# Patient Record
Sex: FEMALE
[De-identification: names, ages, dates, MRNs, and addresses within clinical notes are randomized per-mention and may not be internally consistent; named-entity substitution may affect disease eponyms.]

---

## 2019-01-22 ENCOUNTER — HOSPITAL ENCOUNTER (EMERGENCY)
Dept: HOSPITAL 97 - ER | Age: 29
Discharge: HOME | End: 2019-01-22
Payer: SELF-PAY

## 2019-01-22 VITALS — DIASTOLIC BLOOD PRESSURE: 86 MMHG | TEMPERATURE: 98.7 F | OXYGEN SATURATION: 98 % | SYSTOLIC BLOOD PRESSURE: 120 MMHG
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
## 2019-01-22 DIAGNOSIS — S93.401A: Primary | ICD-10-CM

## 2019-01-22 PROCEDURE — 99283 EMERGENCY DEPT VISIT LOW MDM: CPT

## 2019-01-22 NOTE — RAD REPORT
EXAM DESCRIPTION:  RAD - Ankle Right 3 View - 1/22/2019 7:58 pm

 

CLINICAL HISTORY:  Right ankle pain status post fall

 

FINDINGS:  No fracture or dislocation is seen.

 

Soft tissue swelling is present laterally

## 2019-01-22 NOTE — EDPHYS
Physician Documentation                                                                           

 [facility]                                                                

Name: Adrienne Hernandez                                                                               

Age: 28 yrs                                                                                       

Sex: Female                                                                                       

: 1990                                                                                   

MRN: [id number]                                                                                   

Arrival Date: 2019                                                                          

Time: 18:59                                                                                       

Account#: [id number]                                                                            

Bed 13                                                                                            

Private MD: None, None                                                                            

ED Physician Kennedy Klein                                                                       

HPI:                                                                                              

                                                                                             

20:00 This 28 yrs old  Female presents to ER via Ambulatory with complaints of Ankle  cp  

      Injury, Fall Injury.                                                                        

20:00 The patient presents with an injury, pain, that is acute. The complaints affect the     cp  

      right ankle. Onset: The symptoms/episode began/occurred yesterday. Context: The             

      mechanism of injury involved inversion of the affected ankle. The patient can partially     

      bear weight on the affected extremity. the patient is able to ambulate, with moderate       

      difficulty. Associated signs and symptoms: Pertinent negatives: calf tenderness,            

      numbness, tingling. Severity of symptoms: in the emergency department the symptoms are      

      unchanged, despite home interventions.                                                      

                                                                                                  

OB/GYN:                                                                                           

19:30 LMP 2019                                                                           lp1 

                                                                                                  

Historical:                                                                                       

- Allergies:                                                                                      

19:31 No Known Allergies;                                                                     lp1 

- Home Meds:                                                                                      

19:31 None [Active];                                                                          lp1 

- PMHx:                                                                                           

19:31 GALLSTONES;                                                                             lp1 

- PSHx:                                                                                           

19:31 Cholecystectomy; ;                                                             lp1 

                                                                                                  

- Immunization history:: Adult Immunizations up to date.                                          

- Social history:: Smoking status: Patient/guardian denies using tobacco.                         

- Ebola Screening: : No symptoms or risks identified at this time.                                

                                                                                                  

                                                                                                  

ROS:                                                                                              

20:05 Constitutional: Negative for body aches, chills, fever, poor PO intake.                 cp  

20:05 Eyes: Negative for injury, pain, redness, and discharge.                                cp  

20:05 Cardiovascular: Negative for chest pain.                                                    

20:05 Respiratory: Negative for cough, shortness of breath, wheezing.                             

20:05 Abdomen/GI: Negative for abdominal pain, nausea, vomiting, and diarrhea.                    

20:05 MS/extremity: Positive for pain, swelling, tenderness, of the right ankle, Negative for     

      decreased range of motion, paresthesias.                                                    

20:05 Skin: Negative for cellulitis, rash.                                                        

20:05 Neuro: Negative for numbness, weakness.                                                     

20:05 All other systems are negative.                                                             

                                                                                                  

Exam:                                                                                             

20:10 Constitutional: The patient appears in no acute distress, alert, awake, non-toxic, well cp  

      developed, well nourished.                                                                  

20:10 Head/Face:  Normocephalic, atraumatic.                                                  cp  

20:10 Eyes: Periorbital structures: appear normal, Conjunctiva: normal, no exudate, no            

      injection, Lids and lashes: appear normal, bilaterally.                                     

20:10 ENT: External ear(s): are unremarkable, Nose: is normal, Mouth: is normal.                  

20:10 Chest/axilla: Inspection: normal.                                                           

20:10 Cardiovascular: Rate: normal.                                                               

20:10 Respiratory: the patient does not display signs of respiratory distress,  Respirations:     

      normal, no use of accessory muscles, no retractions, no splinting, no tachypnea.            

20:10 Abdomen/GI: Exam negative for discomfort, distension, guarding, Inspection: abdomen         

      appears normal.                                                                             

20:10 Musculoskeletal/extremity: Extremities: grossly normal except: noted in the right           

      ankle: swelling and tenderness lateral malleolus, There is no evidence of  decreased        

      ROM, deformity, Perfusion: the extremity is normally perfused throughout, Sensation         

      intact.                                                                                     

                                                                                                  

Vital Signs:                                                                                      

19:30  / 86; Pulse 73; Resp 16; Temp 98.7(O); Pulse Ox 98% on R/A; Weight 81.65 kg;     lp1 

      Height 4 ft. 10 in. (147.32 cm); Pain 10/10;                                                

19:30 Body Mass Index 37.62 (81.65 kg, 147.32 cm)                                             lp1 

                                                                                                  

MDM:                                                                                              

19:32 Patient medically screened.                                                             cp  

20:19 Differential diagnosis: fracture, sprain, dislocation. Data reviewed: vital signs,      cp  

      nurses notes, radiologic studies, plain films. Test interpretation: by ED physician or      

      midlevel provider: plain radiologic studies.                                                

                                                                                                  

                                                                                             

20:13 Order name: Crutches; Complete Time: 20:43                                              cp  

                                                                                             

20:13 Order name: Ankle Splint: Aircast; Complete Time: 20:43                                 cp  

                                                                                                  

Administered Medications:                                                                         

No medications were administered                                                                  

                                                                                                  

                                                                                                  

Disposition:                                                                                      

19 20:21 Discharged to Home. Impression: Sprain of ankle - Right.                           

- Condition is Stable.                                                                            

- Discharge Instructions: Ankle Sprain, RICE for Routine Care of Injuries.                        

- Prescriptions for Naprosyn 500 mg Oral Tablet - take 1 tablet by ORAL route 2 times             

  per day take with food; 20 tablet.                                                              

- Medication Reconciliation Form, Thank You Letter, Antibiotic Education, Prescription            

  Opioid Use form.                                                                                

- Follow up: Private Physician; When: 1 week; Reason: pain and swelling continues.                

- Problem is new.                                                                                 

- Symptoms have improved.                                                                         

                                                                                                  

                                                                                                  

                                                                                                  

Addendum:                                                                                         

2019                                                                                        

     09:25 Co-signature as Attending Physician, Kenneyd Klein MD.                                  g
s

                                                                                                  

Signatures:                                                                                       

Salud Vaughn RN                         RN   lp1                                                  

Marvel Madsen PA PA   cp                                                   

Kennedy Klein MD MD   gs                                                   

Yifan Ordoñez RN                    RN   jd3                                                  

                                                                                                  

Corrections: (The following items were deleted from the chart)                                    

                                                                                             

20:46 20:21 2019 20:21 Discharged to Home. Impression: Sprain of ankle - Right.         jd3 

      Condition is Stable. Forms are Medication Reconciliation Form, Thank You Letter,            

      Antibiotic Education, Prescription Opioid Use. Follow up: Private Physician; When: 1        

      week; Reason: pain and swelling continues. Problem is new. Symptoms have improved. cp       

                                                                                                  

**************************************************************************************************

## 2019-01-22 NOTE — ER
Nurse's Notes                                                                                     

 [facility]                                                                

Name: Adrienne Hernandez                                                                               

Age: 28 yrs                                                                                       

Sex: Female                                                                                       

: 1990                                                                                   

MRN: [id number]                                                                                   

Arrival Date: 2019                                                                          

Time: 18:59                                                                                       

Account#: [id number]                                                                            

Bed 13                                                                                            

Private MD: None, None                                                                            

Diagnosis: Sprain of ankle-Right                                                                  

                                                                                                  

Presentation:                                                                                     

                                                                                             

19:29 Presenting complaint: Patient states: Pain to R ankle worsening today after fall        lp1 

      yesterday, missed step while walking and right ankle twisted inward; Able to bear           

      weight but uncomfortable. Transition of care: patient was not received from another         

      setting of care. Onset of symptoms was 2019. Risk Assessment: Do you want       

      to hurt yourself or someone else? Patient reports no desire to harm self or others.         

      Initial Sepsis Screen: Does the patient meet any 2 criteria? No. Patient's initial          

      sepsis screen is negative. Does the patient have a suspected source of infection? No.       

      Patient's initial sepsis screen is negative. Care prior to arrival: None.                   

19:29 Method Of Arrival: Ambulatory                                                           lp1 

19:29 Acuity: LILLIE 4                                                                           lp1 

                                                                                                  

OB/GYN:                                                                                           

19:30 LMP 2019                                                                           lp1 

                                                                                                  

Historical:                                                                                       

- Allergies:                                                                                      

19:31 No Known Allergies;                                                                     lp1 

- Home Meds:                                                                                      

19:31 None [Active];                                                                          lp1 

- PMHx:                                                                                           

19:31 GALLSTONES;                                                                             lp1 

- PSHx:                                                                                           

19:31 Cholecystectomy; ;                                                             lp1 

                                                                                                  

- Immunization history:: Adult Immunizations up to date.                                          

- Social history:: Smoking status: Patient/guardian denies using tobacco.                         

- Ebola Screening: : No symptoms or risks identified at this time.                                

                                                                                                  

                                                                                                  

Screenin:31 Abuse screen: Denies threats or abuse. Denies injuries from another. Nutritional        lp1 

      screening: No deficits noted. Tuberculosis screening: No symptoms or risk factors           

      identified. Fall Risk None identified.                                                      

                                                                                                  

Assessment:                                                                                       

19:43 General: Appears in no apparent distress. uncomfortable, Behavior is calm, cooperative, jd3 

      appropriate for age. Pain: Complains of pain in right ankle Quality of pain is              

      described as sharp, tender. Neuro: Level of Consciousness is awake, alert, obeys            

      commands, Oriented to person, place, time, situation. Cardiovascular: Capillary refill      

      < 3 seconds Patient's skin is warm and dry. Respiratory: Airway is patent Respiratory       

      effort is even, unlabored, Respiratory pattern is regular, symmetrical. GI: No signs        

      and/or symptoms were reported involving the gastrointestinal system. : No signs           

      and/or symptoms were reported regarding the genitourinary system. EENT: No signs and/or     

      symptoms were reported regarding the EENT system. Derm: Skin is intact, Skin is dry,        

      Skin is normal, Skin temperature is warm. Musculoskeletal: Circulation, motion, and         

      sensation intact. Range of motion: limited in right ankle.                                  

20:43 Reassessment: Patient appears in no apparent distress at this time. Patient and/or      jd3 

      family updated on plan of care and expected duration. Pain level reassessed. Patient is     

      alert, oriented x 3, equal unlabored respirations, skin warm/dry/pink.                      

                                                                                                  

Vital Signs:                                                                                      

19:30  / 86; Pulse 73; Resp 16; Temp 98.7(O); Pulse Ox 98% on R/A; Weight 81.65 kg;     lp1 

      Height 4 ft. 10 in. (147.32 cm); Pain 10/10;                                                

19:30 Body Mass Index 37.62 (81.65 kg, 147.32 cm)                                             lp1 

                                                                                                  

ED Course:                                                                                        

18:59 Patient arrived in ED.                                                                  mr  

18:59 None, None is Private Physician.                                                        mr  

19:30 Triage completed.                                                                       lp1 

19:30 Arm band placed on right wrist.                                                         lp1 

19:32 [facility]                                                                cp  

19:32 Kennedy Klein MD is Attending Physician.                                              cp  

19:38 Yifan Ordoñez RN is Primary Nurse.                                                  jd3 

19:44 Patient has correct armband on for positive identification. Bed in low position. Call   jd3 

      light in reach. Side rails up X 1.                                                          

20:43 No provider procedures requiring assistance completed. Patient did not have IV access   jd3 

      during this emergency room visit. Crutch training done. Air stirrup applied to right        

      ankle.                                                                                      

                                                                                                  

Administered Medications:                                                                         

No medications were administered                                                                  

                                                                                                  

                                                                                                  

Outcome:                                                                                          

20:21 Discharge ordered by MD.                                                                cp  

20:44 Discharged to home ambulatory.                                                          jd3 

20:44 Condition: stable                                                                           

20:44 Discharge instructions given to patient, Instructed on discharge instructions, follow       

      up and referral plans. medication usage, Demonstrated understanding of instructions,        

      follow-up care, medications, Prescriptions given X 1.                                       

20:46 Patient left the ED.                                                                    jd3 

                                                                                                  

Signatures:                                                                                       

Cookie Braden Laura, RN                         RN   lp1                                                  

Marvel Madsen PA PA cp Davies, Jonathon, LEVI                    RN   jd3                                                  

                                                                                                  

**************************************************************************************************

## 2020-06-05 ENCOUNTER — HOSPITAL ENCOUNTER (EMERGENCY)
Dept: HOSPITAL 97 - ER | Age: 30
Discharge: HOME | End: 2020-06-05
Payer: SELF-PAY

## 2020-06-05 VITALS — OXYGEN SATURATION: 100 % | TEMPERATURE: 98.3 F | DIASTOLIC BLOOD PRESSURE: 85 MMHG | SYSTOLIC BLOOD PRESSURE: 112 MMHG

## 2020-06-05 DIAGNOSIS — N93.9: ICD-10-CM

## 2020-06-05 DIAGNOSIS — N39.0: Primary | ICD-10-CM

## 2020-06-05 LAB
BUN BLD-MCNC: 11 MG/DL (ref 7–18)
GLUCOSE SERPLBLD-MCNC: 86 MG/DL (ref 74–106)
HCT VFR BLD CALC: 34.7 % (ref 36–45)
LYMPHOCYTES # SPEC AUTO: 2 K/UL (ref 0.7–4.9)
PMV BLD: 9.4 FL (ref 7.6–11.3)
POTASSIUM SERPL-SCNC: 3.6 MMOL/L (ref 3.5–5.1)
RBC # BLD: 3.93 M/UL (ref 3.86–4.86)
UA COMPLETE W REFLEX CULTURE PNL UR: (no result)

## 2020-06-05 PROCEDURE — 87086 URINE CULTURE/COLONY COUNT: CPT

## 2020-06-05 PROCEDURE — 81003 URINALYSIS AUTO W/O SCOPE: CPT

## 2020-06-05 PROCEDURE — 87088 URINE BACTERIA CULTURE: CPT

## 2020-06-05 PROCEDURE — 87186 SC STD MICRODIL/AGAR DIL: CPT

## 2020-06-05 PROCEDURE — 96374 THER/PROPH/DIAG INJ IV PUSH: CPT

## 2020-06-05 PROCEDURE — 80048 BASIC METABOLIC PNL TOTAL CA: CPT

## 2020-06-05 PROCEDURE — 76830 TRANSVAGINAL US NON-OB: CPT

## 2020-06-05 PROCEDURE — 81025 URINE PREGNANCY TEST: CPT

## 2020-06-05 PROCEDURE — 81015 MICROSCOPIC EXAM OF URINE: CPT

## 2020-06-05 PROCEDURE — 99284 EMERGENCY DEPT VISIT MOD MDM: CPT

## 2020-06-05 PROCEDURE — 36415 COLL VENOUS BLD VENIPUNCTURE: CPT

## 2020-06-05 PROCEDURE — 87077 CULTURE AEROBIC IDENTIFY: CPT

## 2020-06-05 PROCEDURE — 85025 COMPLETE CBC W/AUTO DIFF WBC: CPT

## 2020-06-05 NOTE — ER
Nurse's Notes                                                                                     

 [facility]                                                                 

Name: Adrienne Hernandez                                                                               

Age: 29 yrs                                                                                       

Sex: Female                                                                                       

: 1990                                                                                   

MRN: [id number]                                                                                   

Arrival Date: 2020                                                                          

Time: 14:49                                                                                       

Account#: [id number]                                                                            

Bed 19                                                                                            

Private MD:                                                                                       

Diagnosis: Urinary tract infection, site not specified;Abnormal uterine and vaginal bleeding,     

  unspecified                                                                                     

                                                                                                  

Presentation:                                                                                     

                                                                                             

15:03 Chief complaint: Patient states: Vaginal bleeding with lower abd pain for 3 days.       ll1 

      Coronavirus screen: Proceed with normal triage. Patient denies a cough. Patient denies      

      shortness of breath or difficulty breathing. Patient denies measured and/or subjective      

      temperature greater than 100.4F prior to today's visit. Patient denies travel on a          

      cruise ship or to a country the [facility] currently lists as an affected area. Patient denies     

      contact with known and/or suspected case of COVID-19. Ebola Screen: Patient denies          

      travel to an Ebola-affected area in the 21 days before illness onset. Initial Sepsis        

      Screen: Does the patient meet any 2 criteria? No. Patient's initial sepsis screen is        

      negative. Risk Assessment: Do you want to hurt yourself or someone else? Patient            

      reports no desire to harm self or others. Onset of symptoms was Ellen 3, 2020.               

15:03 Method Of Arrival: Ambulatory                                                           1 

15:03 Acuity: LILLIE 3                                                                           ll1 

                                                                                                  

Historical:                                                                                       

- Allergies:                                                                                      

15:05 No Known Allergies;                                                                     ll1 

- PMHx:                                                                                           

15:05 GALLSTONES;                                                                             ll1 

- PSHx:                                                                                           

15:05 Cholecystectomy; ; Tubal ligation;                                             ll1 

                                                                                                  

- Social history:: Smoking status: Patient denies any tobacco usage or history of.                

  Patient/guardian denies using alcohol, street drugs, tobacco products.                          

                                                                                                  

                                                                                                  

Screening:                                                                                        

15:09 Abuse screen: Denies threats or abuse. Nutritional screening: No deficits noted.        ah  

      Tuberculosis screening: No symptoms or risk factors identified. Fall Risk None              

      identified.                                                                                 

                                                                                                  

Assessment:                                                                                       

15:30 General: Appears in no apparent distress. Pain: Complains of pain in right lower        ah  

      quadrant and left lower quadrant. Neuro: Oriented to person, place, time, situation.        

      Cardiovascular: Capillary refill < 3 seconds Patient's skin is warm and dry. GI:            

      Abdomen is non-distended, Bowel sounds present X 4 quads. Abd is non tender. :            

      Reports vaginal bleeding that is light flow. Derm: Skin is intact, is healthy with good     

      turgor.                                                                                     

16:30 Reassessment: Patient and/or family updated on plan of care and expected duration. Pain ah  

      level reassessed. Patient is alert, oriented x 3, equal unlabored respirations, skin        

      warm/dry/pink.                                                                              

                                                                                                  

Vital Signs:                                                                                      

15:03  / 85; Pulse 71; Resp 17; Temp 98.3; Pulse Ox 100% ; Pain 9/10;                   ll1 

                                                                                                  

ED Course:                                                                                        

14:49 Patient arrived in ED.                                                                  mr  

15:00 Peggy Valiente FNP-C is PHCP.                                                        kb  

15:00 Darius Rojas MD is Attending Physician.                                                kb  

15:04 Triage completed.                                                                       ll1 

15:05 Shweta Gimenez, RN is Primary Nurse.                                                         

15:05 Arm band placed on Patient placed in an exam room, on a stretcher.                      ll1 

15:09 Patient has correct armband on for positive identification. Placed in gown. Bed in low  ah  

      position. Call light in reach. Pulse ox on. NIBP on.                                        

16:15 US Transvaginal Study (Probe) In Process Unspecified.                                   EDMS

                                                                                             

15:30 Inserted saline lock: 20 gauge in right antecubital area, using aseptic technique.      ah  

15:45 No provider procedures requiring assistance completed. IV discontinued, intact,         ah  

      bleeding controlled, No redness/swelling at site. Pressure dressing applied.                

                                                                                                  

Administered Medications:                                                                         

06                                                                                             

17:30 Drug: Rocephin 1 grams Route: IV; Rate: calculated rate; Site: right antecubital;       ah  

17:45 Follow up: Response: No adverse reaction                                                  

                                                                                                  

                                                                                                  

Outcome:                                                                                          

17:02 Discharge ordered by MD.                                                                  

17:30 Discharged to home ambulatory.                                                            

17:30 Condition: good                                                                             

17:30 Discharge instructions given to patient, Instructed on discharge instructions, follow       

      up and referral plans. Demonstrated understanding of instructions, follow-up care,          

      medications, Prescriptions given X 1.                                                       

17:47 Patient left the ED.                                                                      

                                                                                                  

Addendum:                                                                                         

2020                                                                                        

     08:19 Addendum: Culture Results: Positive urine culture. No further action required. Bacteria a
a5

           sensitive to prescribed antibiotic.                                                    

                                                                                                  

Signatures:                                                                                       

Dispatcher [employer]                           [facility]                                                 

Peggy Valiente FNP-C FNP-Anpu                                                   

Cookie Braden, Cheyenne RN                     RN   aa5                                                  

Digna Patiño RN                     Shweta Mcgregor RN                         RN   Eran Kilgore RN                       RN   ll1                                                  

                                                                                                  

Corrections: (The following items were deleted from the chart)                                    

                                                                                             

16: 16:30 No provider procedures requiring assistance completed. [facility]  

                                                                                             

16: 16:30 Patient did not have IV access during this emergency room visit. [facility]  

                                                                                                  

**************************************************************************************************

## 2020-06-05 NOTE — EDPHYS
Physician Documentation                                                                           

 [facility]                                                                 

Name: Adrienne Hernandez                                                                               

Age: 29 yrs                                                                                       

Sex: Female                                                                                       

: 1990                                                                                   

MRN: [id number]                                                                                   

Arrival Date: 2020                                                                          

Time: 14:49                                                                                       

Account#: [id number]                                                                            

Bed 19                                                                                            

Private MD:                                                                                       

ED Physician Darius Rojas                                                                         

HPI:                                                                                              

                                                                                             

15:26 This 29 yrs old  Female presents to ER via Ambulatory with complaints of        kb  

      Abdominal Pain.                                                                             

15:26 The patient presents with abdominal pain in the lower abdomen. Onset: The               kb  

      symptoms/episode began/occurred 3 day(s) ago. The symptoms do not radiate. Associated       

      signs and symptoms: Pertinent positives: vaginal bleeding, Pertinent negatives: nausea,     

      vomiting, and diarrhea, anorexia, blood in stools, chest pain, constipation, dysuria,       

      fever, headache, hematuria, palpitations, shortness of breath, vaginal discharge,           

      vomiting blood. The symptoms are described as constant. Modifying factors: The symptoms     

      are alleviated by nothing, the symptoms are aggravated by pressure. Severity of pain:       

      At its worst the pain was moderate in the emergency department the pain is unchanged.       

      The patient has not experienced similar symptoms in the past. The patient has not           

      recently seen a physician. . Pt reports she started having abd pain and vaginal             

      bleeding 3 days ago. States the pain was intermittent at first, now constant. pain on       

      left greater than pain on right. States LMP was 20 so she should not be bleeding       

      at this time. .                                                                             

                                                                                                  

Historical:                                                                                       

- Allergies:                                                                                      

15:05 No Known Allergies;                                                                     ll1 

- PMHx:                                                                                           

15:05 GALLSTONES;                                                                             ll1 

- PSHx:                                                                                           

15:05 Cholecystectomy; ; Tubal ligation;                                             ll1 

                                                                                                  

- Social history:: Smoking status: Patient denies any tobacco usage or history of.                

  Patient/guardian denies using alcohol, street drugs, tobacco products.                          

                                                                                                  

                                                                                                  

ROS:                                                                                              

15:25 Constitutional: Negative for fever, chills, and weight loss, Cardiovascular: Negative   kb  

      for chest pain, palpitations, and edema, Respiratory: Negative for shortness of breath,     

      cough, wheezing, and pleuritic chest pain, Back: Negative for injury and pain,              

      MS/Extremity: Negative for injury and deformity, Skin: Negative for injury, rash, and       

      discoloration, Neuro: Negative for headache, weakness, numbness, tingling, and seizure.     

15:25 Abdomen/GI: Positive for abdominal pain, Negative for nausea, vomiting, and diarrhea,       

      constipation, abdominal cramps, abdominal distension, anorexia.                             

15:25 : Positive for vaginal bleeding.                                                          

                                                                                                  

Exam:                                                                                             

15:25 Constitutional:  This is a well developed, well nourished patient who is awake, alert,  kb  

      and in no acute distress. Head/Face:  Normocephalic, atraumatic. Chest/axilla:  Normal      

      chest wall appearance and motion.  Nontender with no deformity.  No lesions are             

      appreciated. Cardiovascular:  Regular rate and rhythm with a normal S1 and S2.  No          

      gallops, murmurs, or rubs.  Normal PMI, no JVD.  No pulse deficits. Respiratory:  Lungs     

      have equal breath sounds bilaterally, clear to auscultation and percussion.  No rales,      

      rhonchi or wheezes noted.  No increased work of breathing, no retractions or nasal          

      flaring. Back:  No spinal tenderness.  No costovertebral tenderness.  Full range of         

      motion. Skin:  Warm, dry with normal turgor.  Normal color with no rashes, no lesions,      

      and no evidence of cellulitis. MS/ Extremity:  Pulses equal, no cyanosis.                   

      Neurovascular intact.  Full, normal range of motion. Neuro:  Awake and alert, GCS 15,       

      oriented to person, place, time, and situation.  Cranial nerves II-XII grossly intact.      

      Motor strength 5/5 in all extremities.  Sensory grossly intact.  Cerebellar exam            

      normal.  Normal gait.                                                                       

15:25 Abdomen/GI: Inspection: abdomen appears normal, Bowel sounds: normal, in all quadrants,     

      Palpation: soft, in all quadrants, nontender, in the right upper quadrant and left          

      upper quadrant, mild abdominal tenderness, in the right lower quadrant, moderate            

      abdominal tenderness, in the left lower quadrant.                                           

                                                                                                  

Vital Signs:                                                                                      

15:03  / 85; Pulse 71; Resp 17; Temp 98.3; Pulse Ox 100% ; Pain 9/10;                   ll1 

                                                                                                  

MDM:                                                                                              

15:00 Patient medically screened.                                                             kb  

15:25 Data reviewed: vital signs, nurses notes. Data interpreted: Pulse oximetry: on room air kb  

      is 100 %. Interpretation: normal.                                                           

17:01 Counseling: I had a detailed discussion with the patient and/or guardian regarding: the kb  

      historical points, exam findings, and any diagnostic results supporting the                 

      discharge/admit diagnosis, lab results, the need for outpatient follow up, a family         

      practitioner, to return to the emergency department if symptoms worsen or persist or if     

      there are any questions or concerns that arise at home.                                     

                                                                                                  

                                                                                             

15:22 Order name: Urine Microscopic Only; Complete Time: 16:56                                kb  

                                                                                             

15:32 Order name: Urine Dipstick--Ancillary (enter results); Complete Time: 16:17             eb  

                                                                                             

15:32 Order name: Urine Pregnancy--Ancillary (enter results); Complete Time: 16:17            eb  

                                                                                             

16:09 Order name: Basic Metabolic Panel; Complete Time: 17:01                                 kb  

                                                                                             

16:09 Order name: CBC with Diff; Complete Time: 16:57                                         kb  

                                                                                             

16:56 Order name: Urine Culture                                                               [facility]

                                                                                             

15:05 Order name: Urine Dipstick-Ancillary (obtain specimen); Complete Time: 15:22            kb  

                                                                                             

15:05 Order name: Urine Pregnancy Test (obtain specimen); Complete Time: 15:22                kb  

                                                                                             

15:22 Order name: US Transvaginal Study (Probe); Complete Time: 16:33                         kb  

                                                                                             

16:09 Order name: IV Saline Lock; Complete Time: 16:34                                        kb  

                                                                                             

16:09 Order name: Labs collected and sent; Complete Time: 16:34                               kb  

                                                                                                  

Administered Medications:                                                                         

17:30 Drug: Rocephin 1 grams Route: IV; Rate: calculated rate; Site: right antecubital;         

17:45 Follow up: Response: No adverse reaction                                                  

                                                                                                  

                                                                                                  

Disposition:                                                                                      

                                                                                             

07:06 Co-signature as Attending Physician, Darius Rojas MD.                                    rn  

                                                                                                  

Disposition:                                                                                      

20 17:02 Discharged to Home. Impression: Urinary tract infection, site not specified,       

  Abnormal uterine and vaginal bleeding, unspecified.                                             

- Condition is Stable.                                                                            

- Discharge Instructions: Urinary Tract Infection, Adult, Easy-to-Read, Abnormal                  

  Uterine Bleeding, Easy-to-Read.                                                                 

- Prescriptions for Macrobid 100 mg Oral Capsule - take 1 capsule by ORAL route every             

  12 hours for 7 days; 14 capsule.                                                                

- Medication Reconciliation Form, Thank You Letter, Antibiotic Education, Prescription            

  Opioid Use form.                                                                                

- Follow up: Emergency Department; When: As needed; Reason: Worsening of condition.               

  Follow up: Private Physician; When: 2 - 3 days; Reason: Recheck today's complaints,             

  Continuance of care, Re-evaluation by your physician.                                           

                                                                                                  

                                                                                                  

                                                                                                  

Signatures:                                                                                       

Dispatcher [employer]                           EDPeggy Humphreys FNP-C                 FNP-Ckb                                                   

Darius Rojas MD MD rn Baxter, Heather, LEVI                     RN   Shweta Miller, RN                         RN   Eran Kilgore, LEVI                       RN   ll1                                                  

                                                                                                  

Corrections: (The following items were deleted from the chart)                                    

06                                                                                             

17:02 17:02 2020 17:02 Discharged to Home. Impression: Urinary tract infection, site    kb  

      not specified. Condition is Stable. Forms are Medication Reconciliation Form, Thank You     

      Letter, Antibiotic Education, Prescription Opioid Use. Follow up: Emergency Department;     

      When: As needed; Reason: Worsening of condition. Follow up: Private Physician; When: 2      

      - 3 days; Reason: Recheck today's complaints, Continuance of care, Re-evaluation by         

      your physician. kb                                                                          

17:47 17:02 2020 17:02 Discharged to Home. Impression: Urinary tract infection, site    hb  

      not specified; Abnormal uterine and vaginal bleeding, unspecified. Condition is Stable.     

      Forms are Medication Reconciliation Form, Thank You Letter, Antibiotic Education,           

      Prescription Opioid Use. Follow up: Emergency Department; When: As needed; Reason:          

      Worsening of condition. Follow up: Private Physician; When: 2 - 3 days; Reason: Recheck     

      today's complaints, Continuance of care, Re-evaluation by your physician. kb                

                                                                                                  

**************************************************************************************************

## 2020-06-05 NOTE — RAD REPORT
EXAM DESCRIPTION:  US - Transvaginal Study Probe - 6/5/2020 4:14 pm

 

CLINICAL HISTORY:  Pelvic pain

 

COMPARISON:  none

 

FINDINGS:  The uterus measures 7 x 3 x 5cm. A fibroid is not seen. Endometrium measures 4 millimeters
. Nabothian cysts are present within the cervix.

 

The right ovary normal in size and echotexture. Left ovary not seen secondary to overlying bowel gas.


 

Right and left adnexal unremarkable

 

No significant free fluid is seen.

 

IMPRESSION:  No significant abnormalities displayed